# Patient Record
Sex: FEMALE | Race: WHITE | NOT HISPANIC OR LATINO | URBAN - NONMETROPOLITAN AREA
[De-identification: names, ages, dates, MRNs, and addresses within clinical notes are randomized per-mention and may not be internally consistent; named-entity substitution may affect disease eponyms.]

---

## 2023-11-01 ENCOUNTER — APPOINTMENT (OUTPATIENT)
Dept: URBAN - NONMETROPOLITAN AREA CLINIC 49 | Age: 88
Setting detail: DERMATOLOGY
End: 2023-11-01

## 2023-11-01 VITALS — DIASTOLIC BLOOD PRESSURE: 67 MMHG | HEART RATE: 62 BPM | SYSTOLIC BLOOD PRESSURE: 98 MMHG

## 2023-11-01 VITALS — DIASTOLIC BLOOD PRESSURE: 69 MMHG | HEART RATE: 76 BPM | SYSTOLIC BLOOD PRESSURE: 102 MMHG

## 2023-11-01 PROBLEM — D48.5 NEOPLASM OF UNCERTAIN BEHAVIOR OF SKIN: Status: ACTIVE | Noted: 2023-11-01

## 2023-11-01 PROBLEM — C44.792 OTHER SPECIFIED MALIGNANT NEOPLASM OF SKIN OF RIGHT LOWER LIMB, INCLUDING HIP: Status: ACTIVE | Noted: 2023-11-01

## 2023-11-01 PROCEDURE — 88331 PATH CONSLTJ SURG 1 BLK 1SPC: CPT | Mod: 59

## 2023-11-01 PROCEDURE — 17313 MOHS 1 STAGE T/A/L: CPT

## 2023-11-01 PROCEDURE — OTHER MOHS SURGERY WITH PRE-OP FROZEN SECTION SHAVE BIOPSY: OTHER

## 2023-11-01 PROCEDURE — OTHER MOHS SURGERY WITH PRE-OP FROZEN SECTION BIOPSY: OTHER

## 2023-11-01 PROCEDURE — OTHER MOHS SURGERY: OTHER

## 2023-11-01 PROCEDURE — 11102 TANGNTL BX SKIN SINGLE LES: CPT | Mod: 59

## 2023-11-01 PROCEDURE — 17314 MOHS ADDL STAGE T/A/L: CPT

## 2023-11-01 PROCEDURE — 17311 MOHS 1 STAGE H/N/HF/G: CPT

## 2023-11-01 NOTE — PROCEDURE: MOHS SURGERY WITH PRE-OP FROZEN SECTION BIOPSY
Health Maintenance   Topic Date Due    Hepatitis C screen  1960    Diabetic foot exam  08/27/1970    Diabetic retinal exam  08/27/1970    HIV screen  08/27/1975    Diabetic microalbuminuria test  08/27/1978    DTaP/Tdap/Td vaccine (1 - Tdap) 08/27/1979    Shingles Vaccine (1 of 2 - 2 Dose Series) 08/27/2010    Colon cancer screen colonoscopy  02/08/2017    Breast cancer screen  01/07/2018    Lipid screen  10/16/2018    A1C test (Diabetic or Prediabetic)  03/03/2019    Potassium monitoring  11/17/2019    Creatinine monitoring  11/17/2019    Flu vaccine  Completed    Pneumococcal med risk  Completed       Hemoglobin A1C (%)   Date Value   12/03/2018 9.5   10/16/2017 5.7             ( goal A1C is < 7)   No results found for: LABMICR  LDL Calculated (mg/dL)   Date Value   10/16/2017 76       (goal LDL is <100)   AST (IU/L)   Date Value   11/17/2018 19     ALT (IU/L)   Date Value   11/17/2018 22     BUN (mg/dL)   Date Value   11/17/2018 19     BP Readings from Last 3 Encounters:   12/03/18 130/80   09/13/18 130/80   05/24/18 (!) 144/90          (goal 120/80)    All Future Testing planned in [facility]  Lab Frequency Next Occurrence   XR KNEE LEFT (3 VIEWS) Once 03/06/2018   CBC Auto Differential Once 10/13/2018   Comprehensive Metabolic Panel Once 35/29/0670   Lipid Panel Once 10/13/2018   Hemoglobin A1C Once 10/13/2018   XR CHEST STANDARD (2 VW) Once 09/13/2018   XR HIP RIGHT (2-3 VIEWS) Once 12/17/2018       Next Visit Date:  Future Appointments  Date Time Provider Lane [place]   3/6/2019 3:45 PM MD XU [place]            Patient Active Problem List:     S/P REID-BSO     Osteoarthritis     Hypertension     Chronic headaches     Lumbar stenosis with neurogenic claudication     Morbid obesity due to excess calories ([address]     Colon polyps     Major depressive disorder, recurrent episode, severe with mixed features ([address]     Fibromyalgia     Type 2 diabetes mellitus without Referring Physician (Optional): Jay Dupont MD/Cash Drake MD

## 2023-11-01 NOTE — PROCEDURE: MOHS SURGERY WITH PRE-OP FROZEN SECTION SHAVE BIOPSY
MEDICATIONS  (STANDING):  clonazePAM  Tablet 0.5 milliGRAM(s) Oral at bedtime  escitalopram 10 milliGRAM(s) Oral daily  influenza  Vaccine (HIGH DOSE) 0.7 milliLiter(s) IntraMuscular once  losartan 25 milliGRAM(s) Oral daily  mirtazapine 7.5 milliGRAM(s) Oral at bedtime  nicotine - 21 mG/24Hr(s) Patch 1 Patch Transdermal daily  polyethylene glycol 3350 17 Gram(s) Oral daily    MEDICATIONS  (PRN):  LORazepam     Tablet 1 milliGRAM(s) Oral every 6 hours PRN agitation/benzo withdrawal  nicotine  Polacrilex Gum 2 milliGRAM(s) Oral every 2 hours PRN nicotine dependence without withdrawal  traZODone 50 milliGRAM(s) Oral at bedtime PRN insomnia   Complex Repair And Flap Additional Text (Will Appearing After The Standard Complex Repair Text): The complex repair was not sufficient to completely close the primary defect. The remaining additional defect was repaired with the flap mentioned below.

## 2023-11-01 NOTE — PROCEDURE: MOHS SURGERY WITH PRE-OP FROZEN SECTION SHAVE BIOPSY
Hepatitis C negative. Kidney is borderline to push fluids. Must cut down starch and exercise more. A1c essentially unchanged.   Possible UTI pending culture Preop Dx Override (Will Override Above Choices): BABS

## 2023-11-01 NOTE — PROCEDURE: MOHS SURGERY
Patient presented today for dating ultrasound. Reports that she is no longer interested in genetic screening. Order for FTS cancelled.    Christine Samson MS, [facility]  Genetic Counselor     Nasal Turnover Hinge Flap Text: The defect edges were debeveled with a #15 scalpel blade.  Given the size, depth, location of the defect and the defect being full thickness a nasal turnover hinge flap was deemed most appropriate.  Using a sterile surgical marker, an appropriate hinge flap was drawn incorporating the defect. The area thus outlined was incised with a #15 scalpel blade. The flap was designed to recreate the nasal mucosal lining and the alar rim. The skin margins were undermined to an appropriate distance in all directions utilizing iris scissors.

## 2023-11-01 NOTE — PROCEDURE: MOHS SURGERY WITH PRE-OP FROZEN SECTION SHAVE BIOPSY
Spoke with Tarsha and instructed on medications change.    Tarsha verbalized thanks and understanding.  She will follow up in 1 month with update.    Donor Site Anesthesia Type: same as repair anesthesia

## 2023-11-01 NOTE — PROCEDURE: MOHS SURGERY WITH PRE-OP FROZEN SECTION BIOPSY
Pre- operative clearence- pt is evaluated by cardiology , cleared for the surgery.   patient is also medically optimized and is at acceptable risk for the procedure. consider TOV and yanes removal prior to discharge dc to rehab tomorrow Manual Repair Warning Statement: We plan on removing the manually selected variable below in favor of our much easier automatic structured text blocks found in the previous tab. We decided to do this to help make the flow better and give you the full power of structured data. Manual selection is never going to be ideal in our platform and I would encourage you to avoid using manual selection from this point on, especially since I will be sunsetting this feature. It is important that you do one of two things with the customized text below. First, you can save all of the text in a word file so you can have it for future reference. Second, transfer the text to the appropriate area in the Library tab. Lastly, if there is a flap or graft type which we do not have you need to let us know right away so I can add it in before the variable is hidden. No need to panic, we plan to give you roughly 6 months to make the change.

## 2023-11-01 NOTE — PROCEDURE: MOHS SURGERY WITH PRE-OP FROZEN SECTION SHAVE BIOPSY
details… negative Zygomaticofacial Flap Text: Given the location of the defect, shape of the defect and the proximity to free margins a zygomaticofacial flap was deemed most appropriate for repair.  Using a sterile surgical marker, the appropriate flap was drawn incorporating the defect and placing the expected incisions within the relaxed skin tension lines where possible. The area thus outlined was incised deep to adipose tissue with a #15 scalpel blade with preservation of a vascular pedicle.  The skin margins were undermined to an appropriate distance in all directions utilizing iris scissors.  The flap was then placed into the defect and anchored with interrupted buried subcutaneous sutures.

## 2023-11-01 NOTE — PROCEDURE: MOHS SURGERY WITH PRE-OP FROZEN SECTION SHAVE BIOPSY
no Histology Selection Override (Optional- Will Default To Parent Diagnosis If N/A): Squamous Cell Carcinoma

## 2025-06-30 NOTE — PROCEDURE: MOHS SURGERY WITH PRE-OP FROZEN SECTION SHAVE BIOPSY
30-Jun-2025 13:32 Home Suture Removal Text: Patient was provided instructions on removing sutures and will remove their sutures at home.  If they have any questions or difficulties they will call the office.